# Patient Record
Sex: MALE | URBAN - METROPOLITAN AREA
[De-identification: names, ages, dates, MRNs, and addresses within clinical notes are randomized per-mention and may not be internally consistent; named-entity substitution may affect disease eponyms.]

---

## 2018-07-11 ENCOUNTER — OFFICE VISIT (OUTPATIENT)
Dept: URGENT CARE | Facility: CLINIC | Age: 23
End: 2018-07-11
Payer: COMMERCIAL

## 2018-07-11 VITALS
BODY MASS INDEX: 26.85 KG/M2 | WEIGHT: 191.81 LBS | DIASTOLIC BLOOD PRESSURE: 85 MMHG | HEIGHT: 71 IN | RESPIRATION RATE: 16 BRPM | OXYGEN SATURATION: 99 % | HEART RATE: 72 BPM | TEMPERATURE: 98 F | SYSTOLIC BLOOD PRESSURE: 132 MMHG

## 2018-07-11 DIAGNOSIS — G44.209 TENSION HEADACHE: Primary | ICD-10-CM

## 2018-07-11 PROCEDURE — 99203 OFFICE O/P NEW LOW 30 MIN: CPT | Mod: S$GLB,,, | Performed by: EMERGENCY MEDICINE

## 2018-07-11 RX ORDER — NAPROXEN 500 MG/1
500 TABLET ORAL 2 TIMES DAILY WITH MEALS
Qty: 14 TABLET | Refills: 0 | Status: SHIPPED | OUTPATIENT
Start: 2018-07-11 | End: 2018-07-18

## 2018-07-11 NOTE — LETTER
July 11, 2018      [facility] [address] [place]  [facility] [address]  Phone: [phone]  Fax: [phone]       Patient: Fady Can   YOB: 1995  Date of Visit: 07/11/2018    To Whom It May Concern:    Yosef Can  was at [facility] on 07/11/2018. He may return to work/school on 7/12/18 with no restrictions. If you have any questions or concerns, or if I can be of further assistance, please do not hesitate to contact me.    Sincerely,    Td Robles III, MD

## 2018-07-11 NOTE — PROGRESS NOTES
"Subjective:       Patient ID: Fady Can is a 22 y.o. male.    Vitals:    07/11/18 1123   BP: 132/85   Pulse: 72   Resp: 16   Temp: 98.4 °F (36.9 °C)   SpO2: 99%   Weight: 87 kg (191 lb 12.8 oz)   Height: 5' 10.87" (1.8 m)       Chief Complaint: Headache    Pt states headache since yesterday. Pt states he has been studying for the past 2 days, drinking coffee,  and has slept apprx 4 hours.      Headache    This is a new problem. The current episode started yesterday. The problem occurs intermittently. The problem has been unchanged. The pain is located in the occipital region. The pain does not radiate. The quality of the pain is described as aching. The pain is at a severity of 6/10. Pertinent negatives include no abdominal pain, back pain, blurred vision, fever, nausea, sore throat or vomiting. Nothing aggravates the symptoms. He has tried nothing for the symptoms.     Review of Systems   Constitution: Positive for malaise/fatigue. Negative for chills and fever.   HENT: Negative for sore throat.    Eyes: Negative for blurred vision.   Cardiovascular: Negative for chest pain.   Respiratory: Negative for shortness of breath.    Skin: Negative for rash.   Musculoskeletal: Negative for back pain and joint pain.   Gastrointestinal: Negative for abdominal pain, diarrhea, nausea and vomiting.   Neurological: Positive for headaches.   Psychiatric/Behavioral: The patient is not nervous/anxious.        Objective:      Physical Exam   Constitutional: He is oriented to person, place, and time. He appears well-developed and well-nourished. He is cooperative.  Non-toxic appearance. He does not appear ill. No distress.   HENT:   Head: Normocephalic and atraumatic.   Right Ear: Hearing, tympanic membrane, external ear and ear canal normal.   Left Ear: Hearing, tympanic membrane, external ear and ear canal normal.   Nose: Nose normal. No mucosal edema, rhinorrhea or nasal deformity. No epistaxis. Right sinus exhibits no " maxillary sinus tenderness and no frontal sinus tenderness. Left sinus exhibits no maxillary sinus tenderness and no frontal sinus tenderness.   Mouth/Throat: Uvula is midline, oropharynx is clear and moist and mucous membranes are normal. No trismus in the jaw. Normal dentition. No uvula swelling. No posterior oropharyngeal erythema.   No temporal TTP   Eyes: Conjunctivae, EOM and lids are normal. Pupils are equal, round, and reactive to light. No scleral icterus.   Sclera clear bilat   Neck: Trachea normal, normal range of motion, full passive range of motion without pain and phonation normal. Neck supple. No neck rigidity.   Cardiovascular: Normal rate, regular rhythm, normal heart sounds, intact distal pulses and normal pulses.    Pulmonary/Chest: Effort normal and breath sounds normal. No respiratory distress.   Abdominal: Soft. Normal appearance and bowel sounds are normal. He exhibits no distension. There is no tenderness.   Musculoskeletal: Normal range of motion. He exhibits no edema or deformity.   Neurological: He is alert and oriented to person, place, and time. No cranial nerve deficit. He exhibits normal muscle tone. Coordination normal.   Skin: Skin is warm, dry and intact. He is not diaphoretic. No pallor.   Psychiatric: He has a normal mood and affect. His speech is normal and behavior is normal. Judgment and thought content normal. Cognition and memory are normal.   Nursing note and vitals reviewed.      Assessment:       1. Tension headache        Plan:       Fady was seen today for headache.    Diagnoses and all orders for this visit:    Tension headache    Other orders  -     naproxen (NAPROSYN) 500 MG tablet; Take 1 tablet (500 mg total) by mouth 2 (two) times daily with meals. for 7 days          Patient Instructions       Follow up with Primary Care Provider in 5-7 days if not improved  [id number]    Tension Headaches     Massaging your neck muscles can help relieve tension headache pain.      Tension headaches cause a dull, steady pain on both sides of the head and in the neck and the back of the head. The eyes may also feel tired. Tension headaches can be triggered by muscle tension and clenching, lack of sleep, poor posture, eyestrain, stress,depression, anxiety, arthritis of the neck, and other factors.  To help prevent tension headaches  Take the following steps:  · Make sure your work area is properly set up to help you avoid neck strain and eyestrain.  · Make sure that your eyeglass prescription is current and is appropriate for the work you do.  · Learn techniques for relaxing and reducing emotional stress. These include deep breathing, progressive relaxation, yoga, meditation, and biofeedback.  · Maintain a regular exercise regimen under the guidance of a doctor. This can help keep your neck and back flexible, strong, and relaxed.  To relieve the pain  Take the following steps:  · Use moist heat to relax the muscles. Soak in a hot bath or wrap a warm, moist towel around your neck.  · Brush your scalp lightly with a soft hairbrush.  · Give yourself a massage. Knead the muscles running from your shoulders up the back of your skull.  · Use an ice pack. Apply this directly to the place where you feel pain.  · Rest. Sleeping often helps relieve headache pain.  · Drink plenty of fluids. Dehydration is another trigger for headaches.  · Use pain medicine sparingly for moderate to severe pain.   Date Last Reviewed: 10/19/2015  © 6500-2690 [employer]. [address] [place] [address], [place] [address]. All rights reserved. This information is not intended as a substitute for professional medical care. Always follow your healthcare professional's instructions.

## 2018-07-11 NOTE — PATIENT INSTRUCTIONS
Follow up with Primary Care Provider in 5-7 days if not improved  [phone]    Tension Headaches     Massaging your neck muscles can help relieve tension headache pain.     Tension headaches cause a dull, steady pain on both sides of the head and in the neck and the back of the head. The eyes may also feel tired. Tension headaches can be triggered by muscle tension and clenching, lack of sleep, poor posture, eyestrain, stress,depression, anxiety, arthritis of the neck, and other factors.  To help prevent tension headaches  Take the following steps:  · Make sure your work area is properly set up to help you avoid neck strain and eyestrain.  · Make sure that your eyeglass prescription is current and is appropriate for the work you do.  · Learn techniques for relaxing and reducing emotional stress. These include deep breathing, progressive relaxation, yoga, meditation, and biofeedback.  · Maintain a regular exercise regimen under the guidance of a doctor. This can help keep your neck and back flexible, strong, and relaxed.  To relieve the pain  Take the following steps:  · Use moist heat to relax the muscles. Soak in a hot bath or wrap a warm, moist towel around your neck.  · Brush your scalp lightly with a soft hairbrush.  · Give yourself a massage. Knead the muscles running from your shoulders up the back of your skull.  · Use an ice pack. Apply this directly to the place where you feel pain.  · Rest. Sleeping often helps relieve headache pain.  · Drink plenty of fluids. Dehydration is another trigger for headaches.  · Use pain medicine sparingly for moderate to severe pain.   Date Last Reviewed: 10/19/2015  © 7557-0538 [employer]. [address] [place] [address], [place] [address]. All rights reserved. This information is not intended as a substitute for professional medical care. Always follow your healthcare professional's instructions.

## 2019-10-30 ENCOUNTER — OFFICE VISIT (OUTPATIENT)
Dept: URGENT CARE | Facility: CLINIC | Age: 24
End: 2019-10-30
Payer: COMMERCIAL

## 2019-10-30 VITALS
TEMPERATURE: 98 F | HEIGHT: 70 IN | WEIGHT: 191 LBS | HEART RATE: 74 BPM | OXYGEN SATURATION: 100 % | RESPIRATION RATE: 18 BRPM | DIASTOLIC BLOOD PRESSURE: 83 MMHG | SYSTOLIC BLOOD PRESSURE: 137 MMHG | BODY MASS INDEX: 27.35 KG/M2

## 2019-10-30 DIAGNOSIS — R21 RASH OF GENITAL AREA: ICD-10-CM

## 2019-10-30 DIAGNOSIS — L30.9: Primary | ICD-10-CM

## 2019-10-30 DIAGNOSIS — N50.89 PAIN OF MALE GENITALIA: ICD-10-CM

## 2019-10-30 LAB
BILIRUB UR QL STRIP: NEGATIVE
GLUCOSE UR QL STRIP: NEGATIVE
KETONES UR QL STRIP: NEGATIVE
LEUKOCYTE ESTERASE UR QL STRIP: NEGATIVE
PH, POC UA: 7.5
POC BLOOD, URINE: NEGATIVE
POC NITRATES, URINE: NEGATIVE
PROT UR QL STRIP: NEGATIVE
SP GR UR STRIP: 1.02 (ref 1–1.03)
UROBILINOGEN UR STRIP-ACNC: NORMAL (ref 0.3–2.2)

## 2019-10-30 PROCEDURE — 99214 OFFICE O/P EST MOD 30 MIN: CPT | Mod: 25,S$GLB,, | Performed by: PHYSICIAN ASSISTANT

## 2019-10-30 PROCEDURE — 99214 PR OFFICE/OUTPT VISIT, EST, LEVL IV, 30-39 MIN: ICD-10-PCS | Mod: 25,S$GLB,, | Performed by: PHYSICIAN ASSISTANT

## 2019-10-30 PROCEDURE — 87491 CHLMYD TRACH DNA AMP PROBE: CPT

## 2019-10-30 PROCEDURE — 81003 POCT URINALYSIS, DIPSTICK, AUTOMATED, W/O SCOPE: ICD-10-PCS | Mod: QW,S$GLB,, | Performed by: PHYSICIAN ASSISTANT

## 2019-10-30 PROCEDURE — 86703 HIV-1/HIV-2 1 RESULT ANTBDY: CPT

## 2019-10-30 PROCEDURE — 87661 TRICHOMONAS VAGINALIS AMPLIF: CPT

## 2019-10-30 PROCEDURE — 81003 URINALYSIS AUTO W/O SCOPE: CPT | Mod: QW,S$GLB,, | Performed by: PHYSICIAN ASSISTANT

## 2019-10-30 PROCEDURE — 86592 SYPHILIS TEST NON-TREP QUAL: CPT

## 2019-10-30 PROCEDURE — 86696 HERPES SIMPLEX TYPE 2 TEST: CPT

## 2019-10-30 PROCEDURE — 86706 HEP B SURFACE ANTIBODY: CPT

## 2019-10-30 PROCEDURE — 86803 HEPATITIS C AB TEST: CPT

## 2019-10-30 NOTE — PATIENT INSTRUCTIONS
Keep area clean and dry.  Use mild, unscented, non irritant soap.  Continue using Aquaphor on area of rash.  Avoid sexual activity until the rash has healed.  Avoid lubricant and condom that caused initial burning.  Someone will contact you with the results of your lab work.  Follow up with a dermatologist if the rash does not resolve within 1 week.  Establish care with a primary care provider of your choice.     If you were prescribed a narcotic or controlled medication, do not drive or operate heavy equipment or machinery while taking these medications.  You must understand that you've received an Urgent Care treatment only and that you may be released before all your medical problems are known or treated. You, the patient, will arrange for follow up care as instructed.  Follow up with your PCP or specialty clinic as directed in the next 1-2 weeks if not improved or as needed.  You can call [phone] to schedule an appointment with the appropriate provider.  If your condition worsens we recommend that you receive another evaluation at the emergency room immediately or contact your primary medical clinics after hours call service to discuss your concerns.  Please return here or go to the Emergency Department for any concerns or worsening of condition.      Nonspecific Dermatitis  Dermatitis is a skin rash caused by something that touches the skin and makes it irritated and inflamed.  Your skin may be red, swollen, dry, and may be cracked. Blisters may form and ooze. The rash will itch.  Dermatitis can form on the face and neck, backs of hands, forearms, genitals, and lower legs. Dermatitis is not passed from person to person.  Talk with your health care provider about what may have caused the rash. Common things that cause skin allergies are metal in jewelry, plants like poison ivy or poison oak, and certain skin care products. You will need to avoid the source of your rash in the future to prevent it from  coming back. In some cases, the cause of the dermatitis may not be found.  Treatment is done to relieve itching and prevent the rash from coming back. The rash should go away in a few days to a few weeks.  Home care  The health care provider may prescribe medications to relieve swelling and itching. Follow all instructions when using these medications.  · Avoid anything that heats up your skin, such as hot showers or baths, or direct sunlight. This can make itching worse.  · Stay away from whatever you think caused the rash.  · Bathe in warm, not hot, water. Apply a moisturizing lotion after bathing to prevent dry skin.  · Avoid skin irritants such as wool or silk clothing, grease, oils, harsh soaps, and detergents.  · Apply cold compresses to soothe your sores to help relieve your symptoms. Do this for 30 minutes 3 to 4 times a day. You can make a cold compress by soaking a cloth in cold water. Squeeze out excess water. You can add colloidal oatmeal to the water to help reduce itching. For severe itching in a small area, apply an ice pack wrapped in a thin towel. Do this for 20 minutes 3 to 4 times a day.  · You can also help relieve large areas of itching by taking a lukewarm bath with colloidal oatmeal added to the water.  · Use hydrocortisone cream for redness and irritation, unless another medicine was prescribed. You can also use benzocaine anesthetic cream or spray.  · Use oral diphenhydramine to help reduce itching. This is an antihistamine you can buy at drug and grocery stores. It can make you sleepy, so use lower doses during the daytime. Or you can use loratadine. This is an antihistamine that will not make you sleepy. Dont use diphenhydramine if you have glaucoma or have trouble urinating because of an enlarged prostate.  · Wash your hands or use an antibacterial gel often to prevent the spread of the rash.  Follow-up care  Follow up with your health care provider. Make an appointment with your health  care provider if your symptoms do not get better in the next 1 to 2 weeks.  When to seek medical advice  Call your health care provider right away if any of these occur:  · Spreading of the rash to other parts of your body  · Severe swelling of your face, eyelids, mouth, throat or tongue  · Trouble urinating due to swelling in the genital area  · Fever of 100.4°F (38°C) or higher  · Redness or swelling that gets worse  · Pain that gets worse  · Foul-smelling fluid leaking from the skin  · Yellow-brown crusts on the open blisters  · Joint pain   Date Last Reviewed: 7/23/2014  © 4367-8383 [employer] [address] [place] [address], [place] [address]. All rights reserved. This information is not intended as a substitute for professional medical care. Always follow your healthcare professional's instructions.

## 2019-10-30 NOTE — PROGRESS NOTES
"Subjective:       Patient ID: Fady Can is a 24 y.o. male.    Vitals:  height is 5' 10" (1.778 m). His temperature is 98 °F (36.7 °C). His blood pressure is 137/83 and his pulse is 74. His respiration is 18 and oxygen saturation is 100%.     Chief Complaint: Dysuria (1 week now (new shampoo))    Fady Can is a 24 y.o. Male who presented with genital pain for 5-7 days. He denies dysuria. Patient states he has a rash on the skin of his genitals, describes it as "red spots." He is sexually active and endorses condom use. Recently used a new lubrication and experienced burning on his genitals after use. Denies use of new soap, endorses use of new shampoo. Denies testicular pain and penile discharge.          Dysuria    This is a new problem. The current episode started 1 to 4 weeks ago (1 week now ). The problem occurs every urination. The problem has been unchanged. The quality of the pain is described as burning. The pain is at a severity of 5/10. The pain is mild. There has been no fever. Associated symptoms include rash. Pertinent negatives include no chills, frequency, hematuria, nausea, urgency or vomiting. He has tried nothing for the symptoms. There is no history of hypertension or recurrent UTIs.       Constitution: Negative for chills and fever.   Neck: Negative for painful lymph nodes.   Gastrointestinal: Negative for abdominal pain, nausea and vomiting.   Genitourinary: Negative for dysuria, frequency, urgency, urine decreased, hematuria, history of kidney stones, genital trauma, painful intercourse, genital sore, penile discharge, painful ejaculation, penile pain, penile swelling, scrotal swelling and testicular pain.   Musculoskeletal: Negative for back pain.   Skin: Positive for rash and wound. Negative for lesion.   Hematologic/Lymphatic: Negative for swollen lymph nodes.       Objective:      Physical Exam   Constitutional: He is oriented to person, place, and time. He appears " well-developed and well-nourished.  Non-toxic appearance. He does not appear ill. No distress.   HENT:   Head: Normocephalic and atraumatic.   Right Ear: Hearing, tympanic membrane, external ear and ear canal normal.   Left Ear: Hearing, tympanic membrane, external ear and ear canal normal.   Nose: Nose normal. No mucosal edema, rhinorrhea or nasal deformity. No epistaxis. Right sinus exhibits no maxillary sinus tenderness and no frontal sinus tenderness. Left sinus exhibits no maxillary sinus tenderness and no frontal sinus tenderness.   Mouth/Throat: Uvula is midline, oropharynx is clear and moist and mucous membranes are normal. No trismus in the jaw. Normal dentition. No uvula swelling. No posterior oropharyngeal erythema.   Eyes: Pupils are equal, round, and reactive to light. Conjunctivae, EOM and lids are normal. No scleral icterus.   Neck: Trachea normal, normal range of motion, full passive range of motion without pain and phonation normal. No neck rigidity.   Cardiovascular: Normal rate, regular rhythm, normal heart sounds and normal pulses.   Pulmonary/Chest: Effort normal and breath sounds normal. No respiratory distress.   Abdominal: Soft. Normal appearance and bowel sounds are normal. He exhibits no distension. There is no tenderness.   Genitourinary: Testes normal. Right testis shows no mass, no swelling and no tenderness. Left testis shows no mass, no swelling and no tenderness. No discharge found.         Musculoskeletal: Normal range of motion. He exhibits no edema or deformity.   Neurological: He is alert and oriented to person, place, and time. No cranial nerve deficit.   Skin: Skin is warm, dry, intact and not diaphoretic.   Psychiatric: He has a normal mood and affect. His speech is normal and behavior is normal. Judgment and thought content normal. Cognition and memory are normal.   Nursing note and vitals reviewed.        Assessment:       1. Dermatitis of genitalia in male    2. Dysuria     3. Pain of male genitalia        Plan:       Patient treated for irritant contact dermatitis. Given history of sexual activity, STD workup sent.      Dermatitis of genitalia in male  -     Ambulatory referral to Dermatology    Rash of genital area  -     POCT Urinalysis, Dipstick, Automated, W/O Scope  -     C. trachomatis/N. gonorrhoeae by AMP [facility]; Urine  Results for orders placed or performed in visit on 10/30/19   POCT Urinalysis, Dipstick, Automated, W/O Scope   Result Value Ref Range    POC Blood, Urine Negative Negative    POC Bilirubin, Urine Negative Negative    POC Urobilinogen, Urine NORMAL 0.3 - 2.2    POC Ketones, Urine Negative Negative    POC Protein, Urine Negative Negative    POC Nitrates, Urine Negative Negative    POC Glucose, Urine Negative Negative    pH, UA 7.5     POC Specific Gravity, Urine 1.020 1.003 - 1.029    POC Leukocytes, Urine Negative Negative       Pain of male genitalia  -     HIV 1/2 Ag/Ab (4th Gen)  -     Hepatitis B surface Ab - Qualitative  -     Hepatitis C Ab  -     RPR  -     Trichomonas vaginalis, RNA, Qual, Urine (Males Only)  -     Herpes Simplex Virus (HSV) Types 1 & 2, IgG, Herpes Titer      Patient Instructions     Keep area clean and dry.  Use mild, unscented, non irritant soap.  Continue using Aquaphor on area of rash.  Avoid sexual activity until the rash has healed.  Avoid lubricant and condom that caused initial burning.  Someone will contact you with the results of your lab work.  Follow up with a dermatologist if the rash does not resolve within 1 week.  Establish care with a primary care provider of your choice.     If you were prescribed a narcotic or controlled medication, do not drive or operate heavy equipment or machinery while taking these medications.  You must understand that you've received an Urgent Care treatment only and that you may be released before all your medical problems are known or treated. You, the patient, will arrange for follow  up care as instructed.  Follow up with your PCP or specialty clinic as directed in the next 1-2 weeks if not improved or as needed.  You can call [phone] to schedule an appointment with the appropriate provider.  If your condition worsens we recommend that you receive another evaluation at the emergency room immediately or contact your primary medical clinics after hours call service to discuss your concerns.  Please return here or go to the Emergency Department for any concerns or worsening of condition.      Nonspecific Dermatitis  Dermatitis is a skin rash caused by something that touches the skin and makes it irritated and inflamed.  Your skin may be red, swollen, dry, and may be cracked. Blisters may form and ooze. The rash will itch.  Dermatitis can form on the face and neck, backs of hands, forearms, genitals, and lower legs. Dermatitis is not passed from person to person.  Talk with your health care provider about what may have caused the rash. Common things that cause skin allergies are metal in jewelry, plants like poison ivy or poison oak, and certain skin care products. You will need to avoid the source of your rash in the future to prevent it from coming back. In some cases, the cause of the dermatitis may not be found.  Treatment is done to relieve itching and prevent the rash from coming back. The rash should go away in a few days to a few weeks.  Home care  The health care provider may prescribe medications to relieve swelling and itching. Follow all instructions when using these medications.  · Avoid anything that heats up your skin, such as hot showers or baths, or direct sunlight. This can make itching worse.  · Stay away from whatever you think caused the rash.  · Bathe in warm, not hot, water. Apply a moisturizing lotion after bathing to prevent dry skin.  · Avoid skin irritants such as wool or silk clothing, grease, oils, harsh soaps, and detergents.  · Apply cold compresses to soothe  your sores to help relieve your symptoms. Do this for 30 minutes 3 to 4 times a day. You can make a cold compress by soaking a cloth in cold water. Squeeze out excess water. You can add colloidal oatmeal to the water to help reduce itching. For severe itching in a small area, apply an ice pack wrapped in a thin towel. Do this for 20 minutes 3 to 4 times a day.  · You can also help relieve large areas of itching by taking a lukewarm bath with colloidal oatmeal added to the water.  · Use hydrocortisone cream for redness and irritation, unless another medicine was prescribed. You can also use benzocaine anesthetic cream or spray.  · Use oral diphenhydramine to help reduce itching. This is an antihistamine you can buy at drug and grocery stores. It can make you sleepy, so use lower doses during the daytime. Or you can use loratadine. This is an antihistamine that will not make you sleepy. Dont use diphenhydramine if you have glaucoma or have trouble urinating because of an enlarged prostate.  · Wash your hands or use an antibacterial gel often to prevent the spread of the rash.  Follow-up care  Follow up with your health care provider. Make an appointment with your health care provider if your symptoms do not get better in the next 1 to 2 weeks.  When to seek medical advice  Call your health care provider right away if any of these occur:  · Spreading of the rash to other parts of your body  · Severe swelling of your face, eyelids, mouth, throat or tongue  · Trouble urinating due to swelling in the genital area  · Fever of 100.4°F (38°C) or higher  · Redness or swelling that gets worse  · Pain that gets worse  · Foul-smelling fluid leaking from the skin  · Yellow-brown crusts on the open blisters  · Joint pain   Date Last Reviewed: 7/23/2014  © 5462-3178 [employer]. [address] [place] [address], [place] [address]. All rights reserved. This information is not intended as a substitute for professional medical  care. Always follow your healthcare professional's instructions.

## 2019-10-31 LAB
HBV SURFACE AB SER-ACNC: NEGATIVE M[IU]/ML
HCV AB SERPL QL IA: NEGATIVE
HIV 1+2 AB+HIV1 P24 AG SERPL QL IA: NEGATIVE
RPR SER QL: NORMAL

## 2019-11-01 LAB
C TRACH DNA SPEC QL NAA+PROBE: NOT DETECTED
HSV1 IGG SERPL QL IA: POSITIVE
HSV2 IGG SERPL QL IA: NEGATIVE
N GONORRHOEA DNA SPEC QL NAA+PROBE: NOT DETECTED

## 2019-11-04 LAB
T VAGINALIS RRNA SPEC QL NAA+PROBE: NOT DETECTED
TRICHOMONAS VAGINALIS RNA, QUAL, SOURCE: NORMAL

## 2019-11-08 ENCOUNTER — TELEPHONE (OUTPATIENT)
Dept: URGENT CARE | Facility: CLINIC | Age: 24
End: 2019-11-08

## 2019-11-08 NOTE — TELEPHONE ENCOUNTER
Calling about results no answer left vm to call back    11/10/18 11:27 called number listed on chart [phone] wrong number, called contact number with no answer and cannot leave vm

## 2021-01-14 ENCOUNTER — CLINICAL SUPPORT (OUTPATIENT)
Dept: URGENT CARE | Facility: CLINIC | Age: 26
End: 2021-01-14
Payer: COMMERCIAL

## 2021-01-14 DIAGNOSIS — Z20.822 EXPOSURE TO COVID-19 VIRUS: Primary | ICD-10-CM

## 2021-01-14 LAB
CTP QC/QA: YES
SARS-COV-2 RDRP RESP QL NAA+PROBE: NEGATIVE

## 2021-01-14 PROCEDURE — U0002 COVID-19 LAB TEST NON-CDC: HCPCS | Mod: QW,S$GLB,, | Performed by: INTERNAL MEDICINE

## 2021-01-14 PROCEDURE — U0002: ICD-10-PCS | Mod: QW,S$GLB,, | Performed by: INTERNAL MEDICINE

## 2021-04-13 ENCOUNTER — IMMUNIZATION (OUTPATIENT)
Dept: PRIMARY CARE CLINIC | Facility: CLINIC | Age: 26
End: 2021-04-13

## 2021-04-13 DIAGNOSIS — Z23 NEED FOR VACCINATION: Primary | ICD-10-CM

## 2021-04-13 PROCEDURE — 91300 COVID-19, MRNA, LNP-S, PF, 30 MCG/0.3 ML DOSE VACCINE: CPT | Mod: S$GLB,,, | Performed by: INTERNAL MEDICINE

## 2021-04-13 PROCEDURE — 91300 COVID-19, MRNA, LNP-S, PF, 30 MCG/0.3 ML DOSE VACCINE: ICD-10-PCS | Mod: S$GLB,,, | Performed by: INTERNAL MEDICINE

## 2021-04-13 PROCEDURE — 0001A COVID-19, MRNA, LNP-S, PF, 30 MCG/0.3 ML DOSE VACCINE: CPT | Mod: CV19,S$GLB,, | Performed by: INTERNAL MEDICINE

## 2021-04-13 PROCEDURE — 0001A COVID-19, MRNA, LNP-S, PF, 30 MCG/0.3 ML DOSE VACCINE: ICD-10-PCS | Mod: CV19,S$GLB,, | Performed by: INTERNAL MEDICINE

## 2021-05-04 ENCOUNTER — IMMUNIZATION (OUTPATIENT)
Dept: PRIMARY CARE CLINIC | Facility: CLINIC | Age: 26
End: 2021-05-04

## 2021-05-04 DIAGNOSIS — Z23 NEED FOR VACCINATION: Primary | ICD-10-CM

## 2021-05-04 PROCEDURE — 91300 COVID-19, MRNA, LNP-S, PF, 30 MCG/0.3 ML DOSE VACCINE: ICD-10-PCS | Mod: S$GLB,,, | Performed by: INTERNAL MEDICINE

## 2021-05-04 PROCEDURE — 91300 COVID-19, MRNA, LNP-S, PF, 30 MCG/0.3 ML DOSE VACCINE: CPT | Mod: S$GLB,,, | Performed by: INTERNAL MEDICINE

## 2021-05-04 PROCEDURE — 0002A COVID-19, MRNA, LNP-S, PF, 30 MCG/0.3 ML DOSE VACCINE: CPT | Mod: CV19,S$GLB,, | Performed by: INTERNAL MEDICINE

## 2021-05-04 PROCEDURE — 0002A COVID-19, MRNA, LNP-S, PF, 30 MCG/0.3 ML DOSE VACCINE: ICD-10-PCS | Mod: CV19,S$GLB,, | Performed by: INTERNAL MEDICINE
